# Patient Record
Sex: MALE | Race: WHITE | NOT HISPANIC OR LATINO | Employment: OTHER | URBAN - METROPOLITAN AREA
[De-identification: names, ages, dates, MRNs, and addresses within clinical notes are randomized per-mention and may not be internally consistent; named-entity substitution may affect disease eponyms.]

---

## 2023-02-22 ENCOUNTER — OFFICE VISIT (OUTPATIENT)
Dept: FAMILY MEDICINE CLINIC | Facility: CLINIC | Age: 78
End: 2023-02-22
Payer: MEDICARE

## 2023-02-22 VITALS
BODY MASS INDEX: 19.82 KG/M2 | SYSTOLIC BLOOD PRESSURE: 122 MMHG | DIASTOLIC BLOOD PRESSURE: 64 MMHG | WEIGHT: 133.8 LBS | TEMPERATURE: 98 F | HEART RATE: 83 BPM | RESPIRATION RATE: 16 BRPM | OXYGEN SATURATION: 98 % | HEIGHT: 69 IN

## 2023-02-22 DIAGNOSIS — I44.7 LBBB (LEFT BUNDLE BRANCH BLOCK): ICD-10-CM

## 2023-02-22 DIAGNOSIS — J12.82 PNEUMONIA DUE TO COVID-19 VIRUS: Primary | ICD-10-CM

## 2023-02-22 DIAGNOSIS — U07.1 PNEUMONIA DUE TO COVID-19 VIRUS: Primary | ICD-10-CM

## 2023-02-22 PROCEDURE — 99203 OFFICE O/P NEW LOW 30 MIN: CPT | Performed by: PHYSICIAN ASSISTANT

## 2023-04-27 ENCOUNTER — OFFICE VISIT (OUTPATIENT)
Dept: CARDIOLOGY | Facility: CLINIC | Age: 78
End: 2023-04-27
Payer: MEDICARE

## 2023-04-27 VITALS
HEART RATE: 72 BPM | WEIGHT: 136 LBS | SYSTOLIC BLOOD PRESSURE: 164 MMHG | HEIGHT: 69 IN | OXYGEN SATURATION: 98 % | BODY MASS INDEX: 20.14 KG/M2 | DIASTOLIC BLOOD PRESSURE: 72 MMHG

## 2023-04-27 DIAGNOSIS — Z13.220 SCREENING CHOLESTEROL LEVEL: Primary | ICD-10-CM

## 2023-04-27 DIAGNOSIS — I44.7 LBBB (LEFT BUNDLE BRANCH BLOCK): ICD-10-CM

## 2023-04-27 NOTE — PROGRESS NOTES
"[facility]  Consultation H&P  Av Judge  1945  [address] [facility] [address]     VISIT DATE:  04/27/23    PCP: Elizabeth Jackson  [address] [place]  [facility] [address]    IDENTIFICATION: A 77 y.o. male .   [place] [address] [place]    PROBLEM LIST:  LBBB  \"pneumonia\" 2/23 2/23 bnp 1572 ( 450)   Tobacco chewer    Sxhx:  appy      CC:  Chief Complaint   Patient presents with   • left bundle branch block        Allergies  No Known Allergies    Current Medications  No current outpatient medications on file.     History of Present Illness   HPI  Av Judge is a 77 y.o. year old male with the above mentioned PMH who presents for consult from ROE Jo for evaluation of left bundle branch block.  He was recently admitted with COVID-pneumonia to local hospital.  He is found to have left bundle branch block at that time.  He states that he had a cough and was little short of breath for which she sought medical evaluation.  He is not 1 to frequent seeing physicians regularly.    Pt denies any chest pain, dyspnea at rest, dyspnea on exertion, orthopnea, PND, palpitations, lower extremity edema, or claudication. Pt denies history of CHF, DVT, PE, MI, CVA, TIA, or rheumatic fever.       ROS  Review of Systems   Musculoskeletal: Positive for joint pain.   All other systems reviewed and are negative.      SOCIAL HX  Social History     Socioeconomic History   • Marital status:    Tobacco Use   • Smoking status: Never   • Smokeless tobacco: Current     Types: Chew   • Tobacco comments:     Chewing 60 years   Vaping Use   • Vaping Use: Never used   Substance and Sexual Activity   • Alcohol use: Yes     Comment: 1-3 beers weekly if that   • Drug use: Never   • Sexual activity: Defer       FAMILY HX  Family History   Problem Relation Age of Onset   • Cancer Mother        Vitals:    04/27/23 1305   BP: 164/72   BP Location: " "Right arm   Patient Position: Sitting   Pulse: 72   SpO2: 98%   Weight: 61.7 kg (136 lb)   Height: 175.3 cm (69\")     Body mass index is 20.08 kg/m².     PHYSICAL EXAMINATION:  Constitutional:       Appearance: Not in distress.      Comments: Thin   Neck:      Vascular: No JVR. JVD normal.   Pulmonary:      Effort: Pulmonary effort is normal.      Breath sounds: Normal breath sounds. No wheezing. No rhonchi. No rales.   Chest:      Chest wall: Not tender to palpatation.   Cardiovascular:      PMI at left midclavicular line. Normal rate. Regular rhythm. Normal S1. Normal S2.      Murmurs: There is no murmur.      No gallop. No click. No rub.   Pulses:     Intact distal pulses.   Edema:     Peripheral edema absent.   Abdominal:      General: Bowel sounds are normal.      Palpations: Abdomen is soft.      Tenderness: There is no abdominal tenderness.   Musculoskeletal: Normal range of motion.         General: No tenderness.      Comments: Kyphosis Skin:     General: Skin is warm and dry.   Neurological:      General: No focal deficit present.      Mental Status: Alert and oriented to person, place and time.         Diagnostic Data:    ECG 12 Lead    Date/Time: 4/27/2023 1:21 PM  Performed by: Ishan Zepeda MD  Authorized by: Ishan Zepeda MD   Comparison: compared with previous ECG from 2/22/2023  Similar to previous ECG  Rhythm: sinus rhythm  Conduction: left bundle branch block    Clinical impression: abnormal EKG              Advance Care Planning            ASSESSMENT:   Diagnosis Plan   1. Screening cholesterol level        2. LBBB (left bundle branch block)            PLAN:  Unknown lipid profile documented today    Left bundle branch block we will document echocardiogram with any LV dysfunction would consider ischemic evaluation    Hypertension in the office today states he checked regularly at home with systolics of less than 130        ROE Jo, thank you for referring Mr. Judge for " evaluation.  I have forwarded my electronically generated recommendations to you for review.  Please do not hesitate to call with any questions.      Isahn Zepeda MD, FACC

## 2024-06-24 RX ORDER — METOPROLOL SUCCINATE 25 MG/1
25 TABLET, EXTENDED RELEASE ORAL DAILY
Qty: 30 TABLET | Refills: 0 | Status: SHIPPED | OUTPATIENT
Start: 2024-06-24

## 2025-04-29 ENCOUNTER — OFFICE VISIT (OUTPATIENT)
Dept: FAMILY MEDICINE CLINIC | Facility: CLINIC | Age: 80
End: 2025-04-29
Payer: MEDICARE

## 2025-04-29 VITALS
HEIGHT: 69 IN | TEMPERATURE: 98.3 F | WEIGHT: 139 LBS | OXYGEN SATURATION: 100 % | DIASTOLIC BLOOD PRESSURE: 68 MMHG | BODY MASS INDEX: 20.59 KG/M2 | SYSTOLIC BLOOD PRESSURE: 138 MMHG | HEART RATE: 75 BPM

## 2025-04-29 DIAGNOSIS — K40.90 LEFT INGUINAL HERNIA: ICD-10-CM

## 2025-04-29 DIAGNOSIS — I10 PRIMARY HYPERTENSION: Primary | ICD-10-CM

## 2025-04-29 RX ORDER — METOPROLOL SUCCINATE 25 MG/1
25 TABLET, EXTENDED RELEASE ORAL DAILY
Qty: 90 TABLET | Refills: 3 | Status: SHIPPED | OUTPATIENT
Start: 2025-04-29

## 2025-04-29 NOTE — PROGRESS NOTES
"Zeina Judge is a 79 y.o. male.     History of Present Illness   History of Present Illness  The patient presents for evaluation of a suspected hernia.    The hernia was first observed in the fall of 2024, with no recollection of any precipitating injury or heavy lifting. The hernia is occasionally painful, but a pain-free week was experienced last week. However, on Sunday, severe abdominal cramps were reported. There is no exacerbation of pain during bowel movements, and no constipation is reported. A bulge is noticed, which is absent when lying down at night but reappears upon standing and moving in the morning. His occupation as a  involves frequent bending and crouching, but heavy objects are not regularly lifted. He also operates a mower. There is no history of hernias and no urinary issues are reported. The discomfort is localized to the left side, with no right-sided symptoms. The hernia has been manually reducible. No emergency care has been sought since the last visit here, although the ER was visited previously for severe pain episodes, which subsequently subsided. A CT scan was performed at the hospital for an unrelated issue.    Metoprolol was discontinued approximately a year ago due to exhaustion of refills.    SOCIAL HISTORY  He is employed full-time but has taken this week off.       The following portions of the patient's history were reviewed and updated as appropriate: allergies, current medications, past family history, past medical history, past social history, past surgical history, and problem list.    Review of Systems  As noted per HPI     Objective   Blood pressure 138/68, pulse 75, temperature 98.3 °F (36.8 °C), height 175.3 cm (69\"), weight 63 kg (139 lb), SpO2 100%. Body mass index is 20.53 kg/m².     Physical Exam  Vitals reviewed.   Constitutional:       Appearance: Normal appearance.   Cardiovascular:      Rate and Rhythm: Normal rate and regular " rhythm.   Pulmonary:      Effort: Pulmonary effort is normal.      Breath sounds: Normal breath sounds.   Abdominal:      Hernia: A hernia is present.      Comments: Left inguinal hernia. Reducible with palpation    Skin:     General: Skin is warm and dry.   Neurological:      Mental Status: He is alert. He is disoriented.   Psychiatric:         Mood and Affect: Mood normal.         Behavior: Behavior normal.         Results      Assessment & Plan   Assessment & Plan  1. Hernia.  - The hernia is reducible, which is a positive sign. He has been advised to avoid heavy lifting and to take it easy until the surgery is scheduled.  - A referral to a surgeon will be made for further evaluation and potential surgical intervention. He has been informed that the surgery typically involves laparoscopic mesh placement to prevent recurrence.  - If he experiences constipation or straining during bowel movements, he should take an over-the-counter stool softener. He has been advised not to lift more than 10 to 15 pounds.  - Adequate water intake is recommended to facilitate easier bowel movements. If he does not receive a call regarding the hernia repair within 2 weeks, he should contact us for an update.    2. Elevated blood pressure.  - Blood pressure is elevated, indicating a need for medication.  - A prescription for metoprolol will be provided, with a 90-day supply to last for a year.  - He has been advised to start taking the medication as soon as possible.  - Monitoring of blood pressure will be necessary to ensure effectiveness of the treatment.    3. Health Maintenance.  - It has been a while since his last visit.  - Fasting blood work will be scheduled in the next couple of months to monitor his overall health.  - Review of records and counseling on the importance of regular health check-ups were discussed.  - Follow-up appointment will be scheduled in 3 months to review blood work and overall health status.      Diagnoses and all orders for this visit:    1. Primary hypertension (Primary)  -     metoprolol succinate XL (TOPROL-XL) 25 MG 24 hr tablet; Take 1 tablet by mouth Daily.  Dispense: 90 tablet; Refill: 3    2. Left inguinal hernia  -     Ambulatory Referral to General Surgery               Patient or patient representative verbalized consent for the use of Ambient Listening during the visit with  ROE Jo for chart documentation. 5/4/2025  14:51 EDT

## 2025-07-30 ENCOUNTER — OFFICE VISIT (OUTPATIENT)
Dept: FAMILY MEDICINE CLINIC | Facility: CLINIC | Age: 80
End: 2025-07-30
Payer: MEDICARE

## 2025-07-30 ENCOUNTER — TELEPHONE (OUTPATIENT)
Dept: FAMILY MEDICINE CLINIC | Facility: CLINIC | Age: 80
End: 2025-07-30

## 2025-07-30 VITALS
SYSTOLIC BLOOD PRESSURE: 142 MMHG | WEIGHT: 139 LBS | HEART RATE: 62 BPM | OXYGEN SATURATION: 99 % | DIASTOLIC BLOOD PRESSURE: 60 MMHG | BODY MASS INDEX: 20.59 KG/M2 | HEIGHT: 69 IN | TEMPERATURE: 97.5 F

## 2025-07-30 DIAGNOSIS — Z13.220 ENCOUNTER FOR LIPID SCREENING FOR CARDIOVASCULAR DISEASE: ICD-10-CM

## 2025-07-30 DIAGNOSIS — I10 PRIMARY HYPERTENSION: ICD-10-CM

## 2025-07-30 DIAGNOSIS — Z13.6 ENCOUNTER FOR LIPID SCREENING FOR CARDIOVASCULAR DISEASE: ICD-10-CM

## 2025-07-30 DIAGNOSIS — Z12.5 SCREENING FOR MALIGNANT NEOPLASM OF PROSTATE: ICD-10-CM

## 2025-07-30 DIAGNOSIS — Z00.00 MEDICARE ANNUAL WELLNESS VISIT, SUBSEQUENT: Primary | ICD-10-CM

## 2025-07-30 DIAGNOSIS — E55.9 VITAMIN D INSUFFICIENCY: ICD-10-CM

## 2025-07-30 DIAGNOSIS — Z13.1 SCREENING FOR DIABETES MELLITUS: ICD-10-CM

## 2025-07-30 DIAGNOSIS — H25.9 SENILE CATARACT, UNSPECIFIED AGE-RELATED CATARACT TYPE, UNSPECIFIED LATERALITY: ICD-10-CM

## 2025-07-30 NOTE — TELEPHONE ENCOUNTER
Dr. Zepeda,   It was recommended by you for patient to have heart cath back in 2023 due to low EF and LBBB. Patient has not completed at this point. I discussed with him today. He has no new concerning symptoms but still believe he likely has CAD that requires further evaluation. Would your office be willing to reach out to patient to reschedule for this?

## 2025-07-30 NOTE — PROGRESS NOTES
" Subjective   The ABCs of the Annual Wellness Visit  Medicare Wellness Visit      Av Judge is a 79 y.o. patient who presents for a Medicare Wellness Visit.    The following portions of the patient's history were reviewed and   updated as appropriate: allergies, current medications, past family history, past medical history, past social history, past surgical history, and problem list.    Compared to one year ago, the patient's physical   health is the same.  Compared to one year ago, the patient's mental   health is the same.    Recent Hospitalizations:  He was admitted within the past 365 days for hernia surgery     Current Medical Providers:  Patient Care Team:  Elizabeth Jackson PA as PCP - General (Physician Assistant)    Outpatient Medications Prior to Visit   Medication Sig Dispense Refill    metoprolol succinate XL (TOPROL-XL) 25 MG 24 hr tablet Take 1 tablet by mouth Daily. 90 tablet 3     No facility-administered medications prior to visit.     No opioid medication identified on active medication list. I have reviewed chart for other potential  high risk medication/s and harmful drug interactions in the elderly.      Aspirin is not on active medication list.  Aspirin use is indicated based on review of current medical condition/s. Pros and cons of this therapy have been discussed with this patient. Benefits of this medication outweigh potential harm.  Patient has been instructed to start taking this medication..    Patient Active Problem List   Diagnosis    LBBB (left bundle branch block)    Low left ventricular ejection fraction     Advance Care Planning Advance Directive is not on file.  ACP discussion was held with the patient during this visit. Patient does not have an advance directive, information provided.            Objective   Vitals:    07/30/25 1040   BP: 142/60   Pulse: 62   Temp: 97.5 °F (36.4 °C)   SpO2: 99%   Weight: 63 kg (139 lb)   Height: 175.3 cm (69\")   PainSc: 0-No pain " "      Estimated body mass index is 20.53 kg/m² as calculated from the following:    Height as of this encounter: 175.3 cm (69\").    Weight as of this encounter: 63 kg (139 lb).    BMI is within normal parameters. No other follow-up for BMI required.       Gait and Balance Evaluation:  Normal      Does the patient have evidence of cognitive impairment? No  Lab Results   Component Value Date    CHLPL 181 2025    TRIG 62 2025    HDL 61 2025     (H) 2025    VLDL 12 2025    HGBA1C 5.6 2025                                                                                                Health  Risk Assessment    Smoking Status:  Social History     Tobacco Use   Smoking Status Never   Smokeless Tobacco Current    Types: Chew   Tobacco Comments    i chew tobacco     Alcohol Consumption:  Social History     Substance and Sexual Activity   Alcohol Use Yes    Alcohol/week: 3.0 standard drinks of alcohol    Types: 3 Glasses of wine per week    Comment: 1-3 beers weekly if that       Fall Risk Screen  STEADI Fall Risk Assessment was completed, and patient is at LOW risk for falls.Assessment completed on:2025    Depression Screening   Little interest or pleasure in doing things? Not at all   Feeling down, depressed, or hopeless? Not at all   PHQ-2 Total Score 0      Health Habits and Functional and Cognitive Screenin/30/2025    10:45 AM   Functional & Cognitive Status   Do you have difficulty preparing food and eating? No   Do you have difficulty bathing yourself, getting dressed or grooming yourself? No   Do you have difficulty using the toilet? No   Do you have difficulty moving around from place to place? No   Do you have trouble with steps or getting out of a bed or a chair? No   Current Diet Well Balanced Diet   Dental Exam Not up to date   Eye Exam Not up to date   Exercise (times per week) 7 times per week   Current Exercises Include Walking   Do you need help using " the phone?  No   Are you deaf or do you have serious difficulty hearing?  No   Do you need help to go to places out of walking distance? No   Do you need help shopping? No   Do you need help preparing meals?  No   Do you need help with housework?  No   Do you need help with laundry? No   Do you need help taking your medications? No   Do you need help managing money? No   Do you ever drive or ride in a car without wearing a seat belt? No   Have you felt unusual fatigue (could be tiredness), stress, anger or loneliness in the last month? No   Who do you live with? Child   If you need help, do you have trouble finding someone available to you? No   Have you been bothered in the last four weeks by sexual problems? No   Do you have difficulty concentrating, remembering or making decisions? No           Age-appropriate Screening Schedule:  Refer to the list below for future screening recommendations based on patient's age, sex and/or medical conditions. Orders for these recommended tests are listed in the plan section. The patient has been provided with a written plan.    Health Maintenance List  Health Maintenance   Topic Date Due    Pneumococcal Vaccine 50+ (1 of 1 - PCV) 01/26/2026 (Originally 11/15/1994)    TDAP/TD VACCINES (1 - Tdap) 01/26/2026 (Originally 11/15/1964)    ZOSTER VACCINE (1 of 2) 01/26/2026 (Originally 11/15/1994)    COVID-19 Vaccine (1 - 2024-25 season) 01/30/2026 (Originally 9/1/2024)    RSV Vaccine - Adults (1 - 1-dose 75+ series) 07/30/2026 (Originally 11/15/2019)    INFLUENZA VACCINE  10/01/2025    ANNUAL WELLNESS VISIT  07/30/2026                                                                                                                                                CMS Preventative Services Quick Reference  Risk Factors Identified During Encounter  None Identified    The above risks/problems have been discussed with the patient.  Pertinent information has been shared with the patient in the  After Visit Summary.  An After Visit Summary and PPPS were made available to the patient.    Follow Up:   Next Medicare Wellness visit to be scheduled in 1 year.         Additional E&M Note during same encounter follows:  Patient has additional, significant, and separately identifiable condition(s)/problem(s) that require work above and beyond the Medicare Wellness Visit     Chief Complaint  Medicare Wellness-subsequent (Pt is fasting,  No concerns.)    Subjective    HPI  Av is also being seen today for additional medical problem/s.       The patient is an 79-year-old male who presents for a subsequent Medicare wellness visit.    The primary reason for this visit is to follow up on his hypertension and overall health. He has a history of hypertension and is currently taking metoprolol 25 mg extended release. He reports feeling well overall but occasionally forgets to take his medication, estimating this happens a few times a week. He has not monitored his blood pressure at home recently.    He continues to work full-time, putting in 40 hours a week, and occasionally more. He has stopped lifting heavy objects since his hernia surgery but continues to mow the lawn and do some weeding, which he finds strenuous. He reports no leg swelling at the end of the day.    He has not had any follow-up with cardiology since his last visit in 2023, where he was evaluated for left bundle branch block and had a decreased ejection fraction of 26 to 30 percent. He was recommended by cardiology to have a left heart catheterization due to concerns for ischemia.    He has cataracts in his left eye, which are significantly affecting his vision and beginning to impact his right eye as well. He needs to address this issue before December 2025 when his license expires. He has known about the cataracts for some time but has not been regularly seeing an eye doctor.    He underwent hernia surgery since his last visit and reports no  "complications with anesthesia. He experienced constipation for 4 days post-surgery, which was managed with medication. He also took pain medication for 3 days following the surgery.    He reports no issues with anti-inflammatories and believes his memory is still good, although he occasionally forgets why he entered a room. He experiences dry ears and occasional blockage.    Occupations: Full-time worker    PAST SURGICAL HISTORY:  Hernia surgery          Objective   Vital Signs:  /60   Pulse 62   Temp 97.5 °F (36.4 °C)   Ht 175.3 cm (69\")   Wt 63 kg (139 lb)   SpO2 99%   BMI 20.53 kg/m²   Physical Exam  Vitals reviewed.   Constitutional:       Appearance: Normal appearance.   Cardiovascular:      Rate and Rhythm: Normal rate and regular rhythm.   Pulmonary:      Effort: Pulmonary effort is normal.      Breath sounds: Normal breath sounds.   Neurological:      Mental Status: He is alert and oriented to person, place, and time.   Psychiatric:         Mood and Affect: Mood normal.         Behavior: Behavior normal.           Ears: Some earwax present, not awful  Respiratory: Clear to auscultation, no wheezing, rales or rhonchi  Cardiovascular: Regular rate and rhythm, no murmurs, rubs, or gallops  Extremities: No leg swelling        Results  Imaging   - Echocardiogram: 2023, Decreased ejection fraction of 26 to 30%           Assessment and Plan Additional age appropriate preventative wellness advice topics were discussed during today's preventative wellness exam(some topics already addressed during AWV portion of the note above):    Physical Activity: Advised cardiovascular activity 150 minutes per week as tolerated. (example brisk walk for 30 minutes, 5 days a week).   Nutrition: Discussed nutrition plan with patient. Information shared in after visit summary. Goal is for a well balanced diet to enhance overall health.   Healthy Weight: Discussed current and goal BMI with patient. Steps to attain this goal " discussed. Information shared in after visit summary.       1. Hypertension.  - Blood pressure in the office is 142/60.   - Currently taking metoprolol 25 mg extended release but occasionally forgets to take it.  - Advised to use a pill box to help with medication adherence and to monitor blood pressure at home regularly.  - A message will be sent to Dr. Russell's office to arrange a follow-up appointment for further evaluation of his left bundle branch block and decreased ejection fraction. Baby aspirin 81 mg daily was recommended to help thin the blood and reduce the risk of blockages.    2. Cataracts.  - Significant vision issues due to cataracts, particularly in the left eye, affecting the right eye as well.  - Referral to [facility], specifically Dr. Alonzo or Dr. Alcazar, was provided for further evaluation and potential surgical intervention.  - Needs to address cataracts before December due to 's license renewal requirements.  - Advised that the cataract procedure is quick and simple.    3. Post-hernia surgery.  - No complications reported from recent hernia surgery.  - Experienced constipation for four days post-surgery, managed with medication.  - Advised to avoid heavy lifting to prevent recurrence.  - Mowing and weed eating were discussed; advised to avoid strenuous activities until cardiac health is thoroughly assessed.    4. Health maintenance.  - Informed about the availability of tetanus, shingles, and RSV vaccines at the pharmacy.  - Pneumonia booster covering 21 varieties was offered but declined at this time.  - Information on advanced directives was provided for discussion with family members.  - Peroxide drops recommended for dry ears and occasional blockage.  - Blood test will be conducted today to check cholesterol levels and perform a metabolic panel.     Diagnoses and all orders for this visit:    1. Medicare annual wellness visit, subsequent (Primary)  -     CBC w AUTO  Differential  -     Comprehensive metabolic panel  -     Lipid Panel  -     Vitamin D 25 hydroxy  -     PSA SCREENING  -     Hemoglobin A1c  -     TSH    2. Primary hypertension  -     CBC w AUTO Differential  -     Comprehensive metabolic panel  -     TSH    3. Screening for malignant neoplasm of prostate  -     PSA SCREENING    4. Encounter for lipid screening for cardiovascular disease  -     Lipid Panel    5. Vitamin D insufficiency  -     Vitamin D 25 hydroxy    6. Senile cataract, unspecified age-related cataract type, unspecified laterality  -     Ambulatory Referral to Ophthalmology    7. Screening for diabetes mellitus  -     Hemoglobin A1c        I spent 15 minutes caring for Av on this date of service. This time includes time spent by me in the following activities:preparing for the visit, reviewing tests, obtaining and/or reviewing a separately obtained history, performing a medically appropriate examination and/or evaluation , ordering medications, tests, or procedures, referring and communicating with other health care professionals , and documenting information in the medical record  Follow Up   No follow-ups on file.  Patient was given instructions and counseling regarding his condition or for health maintenance advice. Please see specific information pulled into the AVS if appropriate.  Patient or patient representative verbalized consent for the use of Ambient Listening during the visit with  ROE Jo for chart documentation. 8/3/2025  11:16 EDT

## 2025-07-31 DIAGNOSIS — R93.1 LOW LEFT VENTRICULAR EJECTION FRACTION: ICD-10-CM

## 2025-07-31 DIAGNOSIS — I44.7 LBBB (LEFT BUNDLE BRANCH BLOCK): Primary | ICD-10-CM

## 2025-07-31 LAB
25(OH)D3+25(OH)D2 SERPL-MCNC: 46.4 NG/ML (ref 30–100)
ALBUMIN SERPL-MCNC: 4.2 G/DL (ref 3.8–4.8)
ALP SERPL-CCNC: 115 IU/L (ref 44–121)
ALT SERPL-CCNC: 7 IU/L (ref 0–44)
AST SERPL-CCNC: 17 IU/L (ref 0–40)
BASOPHILS # BLD AUTO: 0 X10E3/UL (ref 0–0.2)
BASOPHILS NFR BLD AUTO: 1 %
BILIRUB SERPL-MCNC: 0.5 MG/DL (ref 0–1.2)
BUN SERPL-MCNC: 20 MG/DL (ref 8–27)
BUN/CREAT SERPL: 15 (ref 10–24)
CALCIUM SERPL-MCNC: 9.5 MG/DL (ref 8.6–10.2)
CHLORIDE SERPL-SCNC: 105 MMOL/L (ref 96–106)
CHOLEST SERPL-MCNC: 181 MG/DL (ref 100–199)
CO2 SERPL-SCNC: 21 MMOL/L (ref 20–29)
CREAT SERPL-MCNC: 1.36 MG/DL (ref 0.76–1.27)
EGFRCR SERPLBLD CKD-EPI 2021: 53 ML/MIN/1.73
EOSINOPHIL # BLD AUTO: 0.2 X10E3/UL (ref 0–0.4)
EOSINOPHIL NFR BLD AUTO: 3 %
ERYTHROCYTE [DISTWIDTH] IN BLOOD BY AUTOMATED COUNT: 15.2 % (ref 11.6–15.4)
GLOBULIN SER CALC-MCNC: 3.2 G/DL (ref 1.5–4.5)
GLUCOSE SERPL-MCNC: 91 MG/DL (ref 70–99)
HBA1C MFR BLD: 5.6 % (ref 4.8–5.6)
HCT VFR BLD AUTO: 33.9 % (ref 37.5–51)
HDLC SERPL-MCNC: 61 MG/DL
HGB BLD-MCNC: 10.4 G/DL (ref 13–17.7)
IMM GRANULOCYTES # BLD AUTO: 0 X10E3/UL (ref 0–0.1)
IMM GRANULOCYTES NFR BLD AUTO: 0 %
LDLC SERPL CALC-MCNC: 108 MG/DL (ref 0–99)
LYMPHOCYTES # BLD AUTO: 2.2 X10E3/UL (ref 0.7–3.1)
LYMPHOCYTES NFR BLD AUTO: 31 %
MCH RBC QN AUTO: 28.2 PG (ref 26.6–33)
MCHC RBC AUTO-ENTMCNC: 30.7 G/DL (ref 31.5–35.7)
MCV RBC AUTO: 92 FL (ref 79–97)
MONOCYTES # BLD AUTO: 0.8 X10E3/UL (ref 0.1–0.9)
MONOCYTES NFR BLD AUTO: 11 %
NEUTROPHILS # BLD AUTO: 4 X10E3/UL (ref 1.4–7)
NEUTROPHILS NFR BLD AUTO: 54 %
PLATELET # BLD AUTO: 273 X10E3/UL (ref 150–450)
POTASSIUM SERPL-SCNC: 4.2 MMOL/L (ref 3.5–5.2)
PROT SERPL-MCNC: 7.4 G/DL (ref 6–8.5)
PSA SERPL-MCNC: 0.7 NG/ML (ref 0–4)
RBC # BLD AUTO: 3.69 X10E6/UL (ref 4.14–5.8)
SODIUM SERPL-SCNC: 141 MMOL/L (ref 134–144)
TRIGL SERPL-MCNC: 62 MG/DL (ref 0–149)
TSH SERPL DL<=0.005 MIU/L-ACNC: 1.55 UIU/ML (ref 0.45–4.5)
VLDLC SERPL CALC-MCNC: 12 MG/DL (ref 5–40)
WBC # BLD AUTO: 7.2 X10E3/UL (ref 3.4–10.8)

## 2025-07-31 NOTE — TELEPHONE ENCOUNTER
Left vm x2 , await call back .     Have tried to reach pt multiple times along with the daughter .   Called PCP as well to see if they had different contact #, they too have left pt a vm . Await call back .     Pt needing set up for LHC per [facility] .      8/4/25- reached pt, agreeable to heart cath . Orders placed ,schedulers aware .

## 2025-08-04 DIAGNOSIS — R93.1 LOW LEFT VENTRICULAR EJECTION FRACTION: Primary | ICD-10-CM

## 2025-08-05 LAB
FOLATE SERPL-MCNC: NORMAL NG/ML
IRON SERPL-MCNC: NORMAL UG/DL
SPECIMEN STATUS: NORMAL
UIBC SERPL-MCNC: NORMAL UG/DL
VIT B12 SERPL-MCNC: NORMAL PG/ML
WRITTEN AUTHORIZATION: NORMAL